# Patient Record
Sex: MALE | NOT HISPANIC OR LATINO | Employment: FULL TIME | URBAN - METROPOLITAN AREA
[De-identification: names, ages, dates, MRNs, and addresses within clinical notes are randomized per-mention and may not be internally consistent; named-entity substitution may affect disease eponyms.]

---

## 2023-09-28 PROBLEM — J34.3 HYPERTROPHY OF BOTH INFERIOR NASAL TURBINATES: Status: ACTIVE | Noted: 2023-09-28

## 2023-09-28 PROBLEM — R09.81 NASAL CONGESTION: Status: ACTIVE | Noted: 2023-09-28

## 2023-09-28 PROBLEM — H52.203 MYOPIA OF BOTH EYES WITH ASTIGMATISM: Status: ACTIVE | Noted: 2023-09-28

## 2023-09-28 PROBLEM — H30.003: Status: ACTIVE | Noted: 2023-09-28

## 2023-09-28 PROBLEM — J34.2 DEVIATED NASAL SEPTUM: Status: ACTIVE | Noted: 2023-09-28

## 2023-09-28 PROBLEM — H30.93 BILATERAL POSTERIOR UVEITIS: Status: ACTIVE | Noted: 2023-09-28

## 2023-09-28 PROBLEM — M25.559 ARTHRALGIA OF HIP: Status: ACTIVE | Noted: 2023-09-28

## 2023-09-28 PROBLEM — H47.10 OPTIC NERVE EDEMA: Status: ACTIVE | Noted: 2023-09-28

## 2023-09-28 PROBLEM — M54.32 SCIATICA OF LEFT SIDE: Status: ACTIVE | Noted: 2023-09-28

## 2023-09-28 PROBLEM — H52.13 MYOPIA OF BOTH EYES WITH ASTIGMATISM: Status: ACTIVE | Noted: 2023-09-28

## 2023-09-28 RX ORDER — MELOXICAM 15 MG/1
1 TABLET ORAL DAILY
COMMUNITY
Start: 2016-08-17

## 2023-09-28 RX ORDER — NAPROXEN 500 MG/1
500 TABLET ORAL
COMMUNITY
Start: 2018-02-23

## 2023-09-28 RX ORDER — PREDNISOLONE ACETATE 10 MG/ML
1 SUSPENSION/ DROPS OPHTHALMIC 4 TIMES DAILY
COMMUNITY
Start: 2016-04-24

## 2023-09-28 RX ORDER — PREDNISONE 20 MG/1
TABLET ORAL
COMMUNITY
Start: 2016-05-05

## 2023-09-28 RX ORDER — PREDNISONE 10 MG/1
40 TABLET ORAL
COMMUNITY
Start: 2016-04-13

## 2023-09-28 RX ORDER — FLUTICASONE PROPIONATE 50 MCG
2 SPRAY, SUSPENSION (ML) NASAL DAILY
COMMUNITY
Start: 2018-12-05

## 2023-10-31 ENCOUNTER — APPOINTMENT (OUTPATIENT)
Dept: OTOLARYNGOLOGY | Facility: CLINIC | Age: 38
End: 2023-10-31
Payer: COMMERCIAL

## 2023-11-09 ENCOUNTER — OFFICE VISIT (OUTPATIENT)
Dept: OTOLARYNGOLOGY | Facility: CLINIC | Age: 38
End: 2023-11-09
Payer: COMMERCIAL

## 2023-11-09 VITALS — HEIGHT: 69 IN | BODY MASS INDEX: 28.14 KG/M2 | WEIGHT: 190 LBS

## 2023-11-09 DIAGNOSIS — J02.9 ACUTE PHARYNGITIS, UNSPECIFIED ETIOLOGY: Primary | ICD-10-CM

## 2023-11-09 PROCEDURE — 99212 OFFICE O/P EST SF 10 MIN: CPT | Performed by: OTOLARYNGOLOGY

## 2023-11-09 NOTE — PROGRESS NOTES
Subjective   Patient ID: Juan Ramon Vargas is a 38 y.o. male  HPI  Patient presents for follow-up for subacute pharyngitis.  He is feeling better and the burning sensation in his throat has resolved.  Review of Systems    Objective   Physical Exam  The oral cavity is clear.  The erythema and cobblestoning have resolved.  Assessment/Plan   Diagnoses and all orders for this visit:  Acute pharyngitis, unspecified etiology (Primary)     Subacute pharyngitis resolved.  He will follow-up as needed.